# Patient Record
Sex: FEMALE | Race: WHITE | NOT HISPANIC OR LATINO
[De-identification: names, ages, dates, MRNs, and addresses within clinical notes are randomized per-mention and may not be internally consistent; named-entity substitution may affect disease eponyms.]

---

## 2021-01-13 PROBLEM — Z00.00 ENCOUNTER FOR PREVENTIVE HEALTH EXAMINATION: Status: ACTIVE | Noted: 2021-01-13

## 2021-01-19 ENCOUNTER — APPOINTMENT (OUTPATIENT)
Dept: ORTHOPEDIC SURGERY | Facility: CLINIC | Age: 71
End: 2021-01-19
Payer: MEDICARE

## 2021-01-19 VITALS — HEIGHT: 66 IN | WEIGHT: 174 LBS | RESPIRATION RATE: 16 BRPM | BODY MASS INDEX: 27.97 KG/M2

## 2021-01-19 DIAGNOSIS — Z86.39 PERSONAL HISTORY OF OTHER ENDOCRINE, NUTRITIONAL AND METABOLIC DISEASE: ICD-10-CM

## 2021-01-19 DIAGNOSIS — Z87.39 PERSONAL HISTORY OF OTHER DISEASES OF THE MUSCULOSKELETAL SYSTEM AND CONNECTIVE TISSUE: ICD-10-CM

## 2021-01-19 DIAGNOSIS — Z87.891 PERSONAL HISTORY OF NICOTINE DEPENDENCE: ICD-10-CM

## 2021-01-19 DIAGNOSIS — Z82.61 FAMILY HISTORY OF ARTHRITIS: ICD-10-CM

## 2021-01-19 PROCEDURE — 73110 X-RAY EXAM OF WRIST: CPT | Mod: 50

## 2021-01-19 PROCEDURE — 99203 OFFICE O/P NEW LOW 30 MIN: CPT

## 2021-01-19 PROCEDURE — 73070 X-RAY EXAM OF ELBOW: CPT | Mod: RT

## 2021-01-19 RX ORDER — LEVOTHYROXINE SODIUM 137 UG/1
TABLET ORAL
Refills: 0 | Status: ACTIVE | COMMUNITY

## 2021-02-19 ENCOUNTER — APPOINTMENT (OUTPATIENT)
Dept: ORTHOPEDIC SURGERY | Facility: CLINIC | Age: 71
End: 2021-02-19

## 2021-02-19 ENCOUNTER — APPOINTMENT (OUTPATIENT)
Dept: RHEUMATOLOGY | Facility: CLINIC | Age: 71
End: 2021-02-19
Payer: MEDICARE

## 2021-02-19 VITALS
BODY MASS INDEX: 27.8 KG/M2 | HEIGHT: 66 IN | DIASTOLIC BLOOD PRESSURE: 65 MMHG | OXYGEN SATURATION: 99 % | SYSTOLIC BLOOD PRESSURE: 107 MMHG | HEART RATE: 88 BPM | WEIGHT: 173 LBS | TEMPERATURE: 97.8 F

## 2021-02-19 PROCEDURE — 99203 OFFICE O/P NEW LOW 30 MIN: CPT

## 2021-02-22 ENCOUNTER — LABORATORY RESULT (OUTPATIENT)
Age: 71
End: 2021-02-22

## 2021-02-24 ENCOUNTER — TRANSCRIPTION ENCOUNTER (OUTPATIENT)
Age: 71
End: 2021-02-24

## 2021-02-25 ENCOUNTER — OUTPATIENT (OUTPATIENT)
Dept: OUTPATIENT SERVICES | Facility: HOSPITAL | Age: 71
LOS: 1 days | Discharge: HOME | End: 2021-02-25

## 2021-02-25 ENCOUNTER — APPOINTMENT (OUTPATIENT)
Dept: ORTHOPEDIC SURGERY | Facility: AMBULATORY SURGERY CENTER | Age: 71
End: 2021-02-25
Payer: MEDICARE

## 2021-02-25 LAB
CCP AB SER IA-ACNC: <8 UNITS
CRP SERPL-MCNC: 0.28 MG/DL
ERYTHROCYTE [SEDIMENTATION RATE] IN BLOOD BY WESTERGREN METHOD: 57 MM/HR
GLUCOSE BLDC GLUCOMTR-MCNC: 106 MG/DL — HIGH (ref 70–99)
RF+CCP IGG SER-IMP: NEGATIVE

## 2021-02-25 PROCEDURE — 26055 INCISE FINGER TENDON SHEATH: CPT | Mod: FA

## 2021-02-25 PROCEDURE — 64721 CARPAL TUNNEL SURGERY: CPT | Mod: LT

## 2021-02-25 NOTE — PHYSICAL EXAM
[General Appearance - Alert] : alert [General Appearance - In No Acute Distress] : in no acute distress [General Appearance - Well-Appearing] : healthy appearing [] : no respiratory distress [Respiration, Rhythm And Depth] : normal respiratory rhythm and effort [Exaggerated Use Of Accessory Muscles For Inspiration] : no accessory muscle use

## 2021-03-05 ENCOUNTER — APPOINTMENT (OUTPATIENT)
Dept: ORTHOPEDIC SURGERY | Facility: CLINIC | Age: 71
End: 2021-03-05
Payer: MEDICARE

## 2021-03-05 PROCEDURE — 99024 POSTOP FOLLOW-UP VISIT: CPT

## 2021-03-05 RX ORDER — OXYCODONE AND ACETAMINOPHEN 5; 325 MG/1; MG/1
5-325 TABLET ORAL
Qty: 15 | Refills: 0 | Status: DISCONTINUED | COMMUNITY
Start: 2021-02-24 | End: 2021-03-05

## 2021-03-05 NOTE — ASSESSMENT
[FreeTextEntry1] : 70 year old woman with bilateral hand pain with elevated Rheumatoid factor (29) referred for rheumatology evaluation. Given hand pain and elevated rheumatoid factor, ill evaluate for inflammatory arthropathy, will check ESR, CRP, and anti CCP.  Will obtain ultrasound of the hand bilaterally to evaluate for an underlying inflammatory arthropathy as due to time course of symptoms unlikely to see changes by xray suggestive of inflammatory changes.  Further management pending results.  Patient will be having surgery next week and will follow up after procedure.

## 2021-03-05 NOTE — REVIEW OF SYSTEMS
[Arthralgias] : arthralgias [Negative] : Heme/Lymph [FreeTextEntry9] : hands bilaterally [de-identified] : left hand secondary to carpal tunnel

## 2021-03-08 ENCOUNTER — NON-APPOINTMENT (OUTPATIENT)
Age: 71
End: 2021-03-08

## 2021-07-29 ENCOUNTER — NON-APPOINTMENT (OUTPATIENT)
Age: 71
End: 2021-07-29

## 2021-07-30 ENCOUNTER — APPOINTMENT (OUTPATIENT)
Dept: ORTHOPEDIC SURGERY | Facility: CLINIC | Age: 71
End: 2021-07-30
Payer: MEDICARE

## 2021-07-30 VITALS — BODY MASS INDEX: 27.8 KG/M2 | HEIGHT: 66 IN | WEIGHT: 173 LBS | RESPIRATION RATE: 16 BRPM

## 2021-07-30 DIAGNOSIS — M65.312 TRIGGER THUMB, LEFT THUMB: ICD-10-CM

## 2021-07-30 DIAGNOSIS — G56.03 CARPAL TUNNEL SYNDROM,BILATERAL UPPER LIMBS: ICD-10-CM

## 2021-07-30 DIAGNOSIS — M65.332 TRIGGER FINGER, LEFT MIDDLE FINGER: ICD-10-CM

## 2021-07-30 DIAGNOSIS — M65.352 TRIGGER FINGER, LEFT LITTLE FINGER: ICD-10-CM

## 2021-07-30 PROCEDURE — 99214 OFFICE O/P EST MOD 30 MIN: CPT | Mod: 25

## 2021-07-30 PROCEDURE — 20550 NJX 1 TENDON SHEATH/LIGAMENT: CPT | Mod: F3

## 2021-12-13 ENCOUNTER — APPOINTMENT (OUTPATIENT)
Dept: ORTHOPEDIC SURGERY | Facility: CLINIC | Age: 71
End: 2021-12-13
Payer: MEDICARE

## 2021-12-13 VITALS — RESPIRATION RATE: 16 BRPM | WEIGHT: 173 LBS | BODY MASS INDEX: 27.8 KG/M2 | HEIGHT: 66 IN

## 2021-12-13 DIAGNOSIS — M65.311 TRIGGER THUMB, RIGHT THUMB: ICD-10-CM

## 2021-12-13 DIAGNOSIS — L90.5 SCAR CONDITIONS AND FIBROSIS OF SKIN: ICD-10-CM

## 2021-12-13 PROCEDURE — 20550 NJX 1 TENDON SHEATH/LIGAMENT: CPT | Mod: F3,58

## 2021-12-13 PROCEDURE — 11900 INJECT SKIN LESIONS </W 7: CPT | Mod: RT

## 2021-12-13 PROCEDURE — 20600 DRAIN/INJ JOINT/BURSA W/O US: CPT | Mod: F7

## 2021-12-13 PROCEDURE — 99214 OFFICE O/P EST MOD 30 MIN: CPT | Mod: 25

## 2021-12-13 RX ORDER — LEVOTHYROXINE SODIUM 0.12 MG/1
125 TABLET ORAL
Qty: 30 | Refills: 0 | Status: ACTIVE | COMMUNITY
Start: 2021-10-19

## 2021-12-13 RX ORDER — ESTRADIOL AND NORETHINDRONE ACETATE 1; .5 MG/1; MG/1
1-0.5 TABLET, FILM COATED ORAL
Qty: 84 | Refills: 0 | Status: ACTIVE | COMMUNITY
Start: 2021-07-27

## 2021-12-13 RX ORDER — LIOTHYRONINE SODIUM 5 UG/1
5 TABLET ORAL
Qty: 90 | Refills: 0 | Status: ACTIVE | COMMUNITY
Start: 2021-09-08

## 2021-12-13 RX ORDER — LEVOTHYROXINE SODIUM 0.15 MG/1
150 TABLET ORAL
Qty: 90 | Refills: 0 | Status: ACTIVE | COMMUNITY
Start: 2021-03-08

## 2022-05-09 ENCOUNTER — APPOINTMENT (OUTPATIENT)
Dept: RHEUMATOLOGY | Facility: CLINIC | Age: 72
End: 2022-05-09
Payer: MEDICARE

## 2022-05-09 ENCOUNTER — APPOINTMENT (OUTPATIENT)
Dept: RADIOLOGY | Facility: CLINIC | Age: 72
End: 2022-05-09

## 2022-05-09 ENCOUNTER — RESULT REVIEW (OUTPATIENT)
Age: 72
End: 2022-05-09

## 2022-05-09 ENCOUNTER — OUTPATIENT (OUTPATIENT)
Dept: OUTPATIENT SERVICES | Facility: HOSPITAL | Age: 72
LOS: 1 days | End: 2022-05-09
Payer: MEDICARE

## 2022-05-09 ENCOUNTER — LABORATORY RESULT (OUTPATIENT)
Age: 72
End: 2022-05-09

## 2022-05-09 ENCOUNTER — TRANSCRIPTION ENCOUNTER (OUTPATIENT)
Age: 72
End: 2022-05-09

## 2022-05-09 VITALS
HEART RATE: 86 BPM | HEIGHT: 66.1 IN | SYSTOLIC BLOOD PRESSURE: 117 MMHG | OXYGEN SATURATION: 97 % | BODY MASS INDEX: 30.05 KG/M2 | WEIGHT: 187 LBS | TEMPERATURE: 98 F | DIASTOLIC BLOOD PRESSURE: 69 MMHG

## 2022-05-09 DIAGNOSIS — M19.031 PRIMARY OSTEOARTHRITIS, RIGHT WRIST: ICD-10-CM

## 2022-05-09 DIAGNOSIS — M19.032 PRIMARY OSTEOARTHRITIS, LEFT WRIST: ICD-10-CM

## 2022-05-09 PROCEDURE — 36415 COLL VENOUS BLD VENIPUNCTURE: CPT

## 2022-05-09 PROCEDURE — 99214 OFFICE O/P EST MOD 30 MIN: CPT | Mod: 25

## 2022-05-09 PROCEDURE — 73120 X-RAY EXAM OF HAND: CPT | Mod: 26,50

## 2022-05-09 NOTE — DATA REVIEWED
[FreeTextEntry1] : tsh 0.9311\par esr 46\par crp <1\par rf 28\par cbc\par 8.4\par hgb 12.0\par hct 38.2\par plt 335\par \par bun/creat 19/0.64\par lfts norm\par a1c 5.7\par

## 2022-05-09 NOTE — PHYSICAL EXAM
[General Appearance - Alert] : alert [General Appearance - In No Acute Distress] : in no acute distress [General Appearance - Well Nourished] : well nourished [General Appearance - Well Developed] : well developed [General Appearance - Well-Appearing] : healthy appearing [Sclera] : the sclera and conjunctiva were normal [Examination Of The Oral Cavity] : the lips and gums were normal [Oropharynx] : the oropharynx was normal [Respiration, Rhythm And Depth] : normal respiratory rhythm and effort [Exaggerated Use Of Accessory Muscles For Inspiration] : no accessory muscle use [Edema] : there was no peripheral edema [Abnormal Walk] : normal gait [Nail Clubbing] : no clubbing  or cyanosis of the fingernails [Musculoskeletal - Swelling] : no joint swelling seen [] : no rash [Oriented To Time, Place, And Person] : oriented to person, place, and time [Impaired Insight] : insight and judgment were intact [Affect] : the affect was normal [FreeTextEntry1] : enlargement of the right 3rd PIP with decreased flexion, good handgrip of the left hand. No active synovitis  of the upper and lower extremities.  Decreased ROM of the cervical spine.

## 2022-05-09 NOTE — REVIEW OF SYSTEMS
[Joint Pain] : joint pain [Joint Swelling] : joint swelling [Joint Stiffness] : joint stiffness [Negative] : Heme/Lymph [de-identified] : neuropathy

## 2022-05-09 NOTE — HISTORY OF PRESENT ILLNESS
[FreeTextEntry1] : February 19, 2021\par 70 year old woman referred for rheumatology evaluation.\par Was seeing pain specialist for back pain\par Issues with neck and spine\par \par Dr. Lazar neurologist\par Had carpal tunnel severe on left, less on right\par Diagnosed with peripheral neuropathy by EMG in the upper and lower extremities\par \par Had blood tests completed in October \par Thyroid ab elevated\par RF 29\par \par In the meantime, last March 2020 had COVID\par Was in [place], flew back in March 19, severe flu 5-7 days, lost smell for one day\par Started to feel better with episode of severe fatigue lasted for about 2 months\par \par Right 3rd finger pain, could not wear rings anymore\par 7.5-8.5 increase in ring size\par Thumbs started to crack and snap \par Hands are always in pain now, feels unable to use as previously\par Left hand operation scheduled for next week, carpal tunnel and trigger finger release\par \par Feels difficult to sleep \par Feels condition decreasing since covid\par Feels brain fog\par Had second covid vaccine felt joint pain all over, Moderna, last Saturday\par \par Had steroid injection for the carpal tunnel which helped \par Can't wear a brace for carpal tunnel because feels worsens symptoms\par If wears gloves, the warmth feels better\par During the day feels pain is better\par Feels an electric shock in the finger, now has to go away on its own, takes about couple of minutes or less to improve\par \par Base of the right thumb pain\par Left hand pain related to known carpal tunnel \par \par Taking tumeric \par Takes levothyroxine \par Estradiol but now \par Xrays completed with hand orthopedist, not available to me at this time\par \par May 9, 2022\par Pain in the hands\par Pain in the back, feet and neck\par Seeing chiropractor\par Started PT for the neck and back\par CTS bilaterally, had surgery in the left hand, did not help all of the symptoms\par Arthritis in right 3rd PIP and thumb\par Helped with paraffin and hot water bath\par Exercises\par Night is the worst\par Brace does not help at night, feels compressed\par As the night progresses the pain improves\par Joined the gym, walking in the water, jacuzzi, steam room, sauna\par Had covid in March 2020, initially though had the flu, feels ot quite the same since that time\par Trying to lose weight\par Last month, thyroid medication dose reduced, felt weak and now increased again, levothyroid 137\par metformin recently started, started 500 mg, bid dosing now\par vitamins and minerals\par tumeric\par alem\par vitamin E, C, selenium\par zinc\par magnesium at night\par resveratrol with bene-\par EPA\par Takes biotin in the morning\par Recent labs reviewed on phone, ESR 45, CRP normal \par RF 28

## 2022-05-09 NOTE — ASSESSMENT
[FreeTextEntry1] : 71 year old woman with bilateral hand pain with elevated Rheumatoid factor (29) returns for follow up   rheumatology evaluation. Exams findings and xrays findings most consistent with degenerative arthritis at this time, but given an increase in pain recently associated with some stiffness, will further evaluate for possible secondary inflammatory arthritis as well.  Will repeat ESR, CRP, and anti CCP. Will repeat xrays of the hands bilaterally to evaluate for an underlying inflammatory arthropathy as well.  Discussed possible addition of cymbalta given peripheral neuropathy as well.  Further management pending results.

## 2022-06-16 LAB
ALBUMIN MFR SERPL ELPH: 57.7 %
ALBUMIN SERPL-MCNC: 4.3 G/DL
ALBUMIN/GLOB SERPL: 1.4 RATIO
ALPHA1 GLOB MFR SERPL ELPH: 3.4 %
ALPHA1 GLOB SERPL ELPH-MCNC: 0.3 G/DL
ALPHA2 GLOB MFR SERPL ELPH: 12.7 %
ALPHA2 GLOB SERPL ELPH-MCNC: 0.9 G/DL
B-GLOBULIN MFR SERPL ELPH: 8.9 %
B-GLOBULIN SERPL ELPH-MCNC: 0.7 G/DL
CCP AB SER IA-ACNC: <8 UNITS
CRP SERPL-MCNC: <3 MG/L
ERYTHROCYTE [SEDIMENTATION RATE] IN BLOOD BY WESTERGREN METHOD: 47 MM/HR
GAMMA GLOB FLD ELPH-MCNC: 1.3 G/DL
GAMMA GLOB MFR SERPL ELPH: 17.3 %
INTERPRETATION SERPL IEP-IMP: NORMAL
PROT SERPL-MCNC: 7.4 G/DL
PROT SERPL-MCNC: 7.4 G/DL
RF+CCP IGG SER-IMP: NEGATIVE
VIT B12 SERPL-MCNC: 540 PG/ML

## 2022-06-21 ENCOUNTER — APPOINTMENT (OUTPATIENT)
Dept: RHEUMATOLOGY | Facility: CLINIC | Age: 72
End: 2022-06-21
Payer: MEDICARE

## 2022-06-21 ENCOUNTER — LABORATORY RESULT (OUTPATIENT)
Age: 72
End: 2022-06-21

## 2022-06-21 VITALS
BODY MASS INDEX: 29.09 KG/M2 | HEIGHT: 66.1 IN | WEIGHT: 181 LBS | TEMPERATURE: 98 F | DIASTOLIC BLOOD PRESSURE: 55 MMHG | SYSTOLIC BLOOD PRESSURE: 105 MMHG | OXYGEN SATURATION: 97 % | HEART RATE: 86 BPM

## 2022-06-21 DIAGNOSIS — M79.643 PAIN IN UNSPECIFIED HAND: ICD-10-CM

## 2022-06-21 PROCEDURE — 99213 OFFICE O/P EST LOW 20 MIN: CPT | Mod: 25

## 2022-06-21 PROCEDURE — 36415 COLL VENOUS BLD VENIPUNCTURE: CPT

## 2022-06-22 LAB
ALBUMIN SERPL ELPH-MCNC: 4.4 G/DL
ALP BLD-CCNC: 67 U/L
ALT SERPL-CCNC: 18 U/L
ANION GAP SERPL CALC-SCNC: 11 MMOL/L
AST SERPL-CCNC: 16 U/L
BASOPHILS # BLD AUTO: 0.05 K/UL
BASOPHILS NFR BLD AUTO: 0.6 %
BILIRUB SERPL-MCNC: 0.6 MG/DL
BUN SERPL-MCNC: 19 MG/DL
CALCIUM SERPL-MCNC: 9.6 MG/DL
CHLORIDE SERPL-SCNC: 105 MMOL/L
CO2 SERPL-SCNC: 23 MMOL/L
CREAT SERPL-MCNC: 0.56 MG/DL
CRP SERPL-MCNC: <3 MG/L
EGFR: 97 ML/MIN/1.73M2
EOSINOPHIL # BLD AUTO: 0.16 K/UL
EOSINOPHIL NFR BLD AUTO: 1.8 %
ERYTHROCYTE [SEDIMENTATION RATE] IN BLOOD BY WESTERGREN METHOD: 45 MM/HR
GLUCOSE SERPL-MCNC: 101 MG/DL
HCT VFR BLD CALC: 38.2 %
HGB BLD-MCNC: 11.5 G/DL
IMM GRANULOCYTES NFR BLD AUTO: 0.2 %
LYMPHOCYTES # BLD AUTO: 2.69 K/UL
LYMPHOCYTES NFR BLD AUTO: 30.9 %
MAN DIFF?: NORMAL
MCHC RBC-ENTMCNC: 19.5 PG
MCHC RBC-ENTMCNC: 30.1 GM/DL
MCV RBC AUTO: 64.9 FL
MONOCYTES # BLD AUTO: 0.65 K/UL
MONOCYTES NFR BLD AUTO: 7.5 %
NEUTROPHILS # BLD AUTO: 5.14 K/UL
NEUTROPHILS NFR BLD AUTO: 59 %
PLATELET # BLD AUTO: 277 K/UL
POTASSIUM SERPL-SCNC: 4.8 MMOL/L
PROT SERPL-MCNC: 7.1 G/DL
RBC # BLD: 5.89 M/UL
RBC # FLD: 16.7 %
SODIUM SERPL-SCNC: 139 MMOL/L
WBC # FLD AUTO: 8.71 K/UL

## 2022-06-22 NOTE — HISTORY OF PRESENT ILLNESS
[FreeTextEntry1] : February 19, 2021\par 70 year old woman referred for rheumatology evaluation.\par Was seeing pain specialist for back pain\par Issues with neck and spine\par \par Dr. Lazar neurologist\par Had carpal tunnel severe on left, less on right\par Diagnosed with peripheral neuropathy by EMG in the upper and lower extremities\par \par Had blood tests completed in October \par Thyroid ab elevated\par RF 29\par \par In the meantime, last March 2020 had COVID\par Was in [place], flew back in March 19, severe flu 5-7 days, lost smell for one day\par Started to feel better with episode of severe fatigue lasted for about 2 months\par \par Right 3rd finger pain, could not wear rings anymore\par 7.5-8.5 increase in ring size\par Thumbs started to crack and snap \par Hands are always in pain now, feels unable to use as previously\par Left hand operation scheduled for next week, carpal tunnel and trigger finger release\par \par Feels difficult to sleep \par Feels condition decreasing since covid\par Feels brain fog\par Had second covid vaccine felt joint pain all over, Moderna, last Saturday\par \par Had steroid injection for the carpal tunnel which helped \par Can't wear a brace for carpal tunnel because feels worsens symptoms\par If wears gloves, the warmth feels better\par During the day feels pain is better\par Feels an electric shock in the finger, now has to go away on its own, takes about couple of minutes or less to improve\par \par Base of the right thumb pain\par Left hand pain related to known carpal tunnel \par \par Taking tumeric \par Takes levothyroxine \par Estradiol but now \par Xrays completed with hand orthopedist, not available to me at this time\par \par May 9, 2022\par Pain in the hands\par Pain in the back, feet and neck\par Seeing chiropractor\par Started PT for the neck and back\par CTS bilaterally, had surgery in the left hand, did not help all of the symptoms\par Arthritis in right 3rd PIP and thumb\par Helped with paraffin and hot water bath\par Exercises\par Night is the worst\par Brace does not help at night, feels compressed\par As the night progresses the pain improves\par Joined the gym, walking in the water, jacuzzi, steam room, sauna\par Had covid in March 2020, initially though had the flu, feels not quite the same since that time\par Trying to lose weight\par Last month, thyroid medication dose reduced, felt weak and now increased again, levothyroid 137\par metformin recently started, started 500 mg, bid dosing now\par vitamins and minerals\par tumeric\par alem\par vitamin E, C, selenium\par zinc\par magnesium at night\par resveratrol with bene-\par EPA\par Takes biotin in the morning\par Recent labs reviewed on phone, ESR 45, CRP normal \par RF 28\par \par June 21, 2022\par Started on cymbalta 30 mg, feels benefit in the hands\par Left hand with decreased handgrip but pain better\par Taking tumeric, zinc, vitamin c, magnesium, selenium\par Vitamin D drops as well\par Also had the flu two weeks ago\par Had covid pcr neg\par Took antibiotics for sore throat, completed 10 day course of amoxicillin\par Reviewed lab results with patient\par Reviewed xray with patient

## 2022-06-22 NOTE — ASSESSMENT
[FreeTextEntry1] : 71 year old woman with bilateral hand pain with elevated Rheumatoid factor of 29 with negative CCP antibody,  returns for rheumatology follow up.  Exam findings and xrays findings most consistent with degenerative arthritis at this time, labs reviewed with ESR 47 and CRP and anti CCP ab  normal. Serum protein electrophoresis abnormal pattern identified which is not fully diagnostic raises the possibility of a weak restricted band, will repeat today.  atient started on Cymbalta 30 mg daily for peripheral neuropathy as well, with benefit in symptoms to date.  Further management pending repeat blood tests to be completed today in office. No

## 2022-06-22 NOTE — DATA REVIEWED
[FreeTextEntry1] : tsh 0.9311\par esr 46\par crp <1\par rf 28\par cbc\par 8.4\par hgb 12.0\par hct 38.2\par plt 335\par \par bun/creat 19/0.64\par lfts norm\par a1c 5.7\par \par EMG July 14, 2020\par Abnormal study.  Chronic bilateral C5-C6 and C6-C7 radiculopathies with bilateral moderately severe median neuropathies at the wrists as can be seen in carpal tunnel syndrome all without active denervation.\par \par \par  Xray Hand 2 Views, Bilateral             Final\par \par No Documents Attached\par \par \par \par \par   EXAM: [id number] - XR HAND 2 VIEWS BI  - ORDERED BY: MORA ZHU\par \par \par PROCEDURE DATE:  05/09/2022\par \par \par \par INTERPRETATION:  CLINICAL HISTORY: 71-year-old with hand pain.\par \par \par \par \par IMPRESSION: Frontal, lateral and oblique views of the left hand demonstrates degenerative change of the first CMC joint. Degenerative change of the distal radioulnar joint. Radiocarpal, intercarpal, MCP, PIP and DIP joint spaces are preserved. No fracture. No dislocation. Appropriate carpal alignment. Appropriate osseous mineralization. No evidence of inflammatory arthropathy.\par \par \par Frontal, lateral and oblique views of the right hand demonstrates degenerative change of the distal radioulnar joint and first CMC joint. The radiocarpal intercarpal, carpometacarpal, MCP, PIP and DIP joint spaces are preserved. Appropriate carpal alignment. Appropriate osseous mineralization. No evidence of inflammatory arthropathy.\par \par \par \par \par \par \par Dr. Gianluca Che can be reached at [email or web address] with questions regarding this report.\par \par --- End of Report ---\par \par \par \par \par \par \par GIANLUCA CHE MD; Attending Radiologist\par This document has been electronically signed. May  9 2022  2:41PM\par \par  \par \par  Ordered by: MORA ZHU       Collected/Examined: [id number] 11:21AM       \par Verified by: MORA ZHU 09May2022 02:55PM       \par  Result Communication: No patient communication needed at this time;\par Stage: Final       \par  Performed at: [facility]       Resulted: 09May2022 02:40PM       Last Updated: 09May2022 02:55PM       Accession: [id number]

## 2022-06-29 ENCOUNTER — NON-APPOINTMENT (OUTPATIENT)
Age: 72
End: 2022-06-29

## 2022-06-29 LAB
ALBUMIN MFR SERPL ELPH: 59.2 %
ALBUMIN SERPL-MCNC: 4.2 G/DL
ALBUMIN/GLOB SERPL: 1.4 RATIO
ALPHA1 GLOB MFR SERPL ELPH: 3.5 %
ALPHA1 GLOB SERPL ELPH-MCNC: 0.2 G/DL
ALPHA2 GLOB MFR SERPL ELPH: 11.9 %
ALPHA2 GLOB SERPL ELPH-MCNC: 0.8 G/DL
B-GLOBULIN MFR SERPL ELPH: 8.8 %
B-GLOBULIN SERPL ELPH-MCNC: 0.6 G/DL
GAMMA GLOB FLD ELPH-MCNC: 1.2 G/DL
GAMMA GLOB MFR SERPL ELPH: 16.6 %
INTERPRETATION SERPL IEP-IMP: NORMAL
PROT SERPL-MCNC: 7.1 G/DL
PROT SERPL-MCNC: 7.1 G/DL

## 2022-07-05 ENCOUNTER — NON-APPOINTMENT (OUTPATIENT)
Age: 72
End: 2022-07-05

## 2023-04-14 ENCOUNTER — APPOINTMENT (OUTPATIENT)
Dept: ORTHOPEDIC SURGERY | Facility: CLINIC | Age: 73
End: 2023-04-14
Payer: MEDICARE

## 2023-04-14 VITALS — WEIGHT: 181 LBS | HEIGHT: 66 IN | BODY MASS INDEX: 29.09 KG/M2 | RESPIRATION RATE: 16 BRPM

## 2023-04-14 DIAGNOSIS — M65.341 TRIGGER FINGER, RIGHT RING FINGER: ICD-10-CM

## 2023-04-14 DIAGNOSIS — M65.322 TRIGGER FINGER, LEFT INDEX FINGER: ICD-10-CM

## 2023-04-14 DIAGNOSIS — M65.321 TRIGGER FINGER, RIGHT INDEX FINGER: ICD-10-CM

## 2023-04-14 DIAGNOSIS — M65.342 TRIGGER FINGER, LEFT RING FINGER: ICD-10-CM

## 2023-04-14 DIAGNOSIS — G56.01 CARPAL TUNNEL SYNDROME, RIGHT UPPER LIMB: ICD-10-CM

## 2023-04-14 DIAGNOSIS — M65.331 TRIGGER FINGER, RIGHT MIDDLE FINGER: ICD-10-CM

## 2023-04-14 PROCEDURE — 20550 NJX 1 TENDON SHEATH/LIGAMENT: CPT | Mod: F8

## 2023-04-14 PROCEDURE — 99213 OFFICE O/P EST LOW 20 MIN: CPT | Mod: 25

## 2023-09-20 ENCOUNTER — APPOINTMENT (OUTPATIENT)
Dept: RHEUMATOLOGY | Facility: CLINIC | Age: 73
End: 2023-09-20

## 2024-08-27 VITALS — RESPIRATION RATE: 16 BRPM | WEIGHT: 181 LBS | HEIGHT: 66 IN | BODY MASS INDEX: 29.09 KG/M2

## 2024-08-27 NOTE — REVIEW OF SYSTEMS
[Ambidextrous] : ambidextrous [Joint Pain] : joint pain [Joint Stiffness] : joint stiffness [Negative] : Allergic/Immunologic

## 2024-08-29 ENCOUNTER — APPOINTMENT (OUTPATIENT)
Dept: ORTHOPEDIC SURGERY | Facility: CLINIC | Age: 74
End: 2024-08-29
Payer: MEDICARE

## 2024-08-29 DIAGNOSIS — M65.351 TRIGGER FINGER, RIGHT LITTLE FINGER: ICD-10-CM

## 2024-08-29 DIAGNOSIS — M65.311 TRIGGER THUMB, RIGHT THUMB: ICD-10-CM

## 2024-08-29 DIAGNOSIS — M65.331 TRIGGER FINGER, RIGHT MIDDLE FINGER: ICD-10-CM

## 2024-08-29 DIAGNOSIS — M65.321 TRIGGER FINGER, RIGHT INDEX FINGER: ICD-10-CM

## 2024-08-29 DIAGNOSIS — M65.341 TRIGGER FINGER, RIGHT RING FINGER: ICD-10-CM

## 2024-08-29 DIAGNOSIS — R20.2 ANESTHESIA OF SKIN: ICD-10-CM

## 2024-08-29 DIAGNOSIS — R20.0 ANESTHESIA OF SKIN: ICD-10-CM

## 2024-08-29 PROCEDURE — 73130 X-RAY EXAM OF HAND: CPT | Mod: 50

## 2024-08-29 PROCEDURE — 99213 OFFICE O/P EST LOW 20 MIN: CPT

## 2024-08-29 NOTE — PHYSICAL EXAM
[de-identified] : There is no skin changes or atrophy from previous injections.  The wrists have a symmetric range of motion bilaterally. There is no tenderness over the scaphoid, scapholunate or lunotriquetral ligaments bilaterally. There is a negative Maldonado test bilaterally. There is negative tenderness over the radial ulnar joint or TFCC and no evidence of instability bilaterally. No tenderness over the pisotriquetral or hamate hook or CMC joint bilaterally. There is 5 over 5 strength of the wrists bilaterally.  There is positive tenderness over the A1 pulleys of the right second third and fourth digits.  No tenderness over the A1 pulleys of the thumb and fifth.  No tenderness or instability of the MP PIP DIP joint or flexor or extensor mechanisms.  Upon making a fist the 2nd through 5th digits are 3 cm from the palm passively correctable to 1 cm.  There is thenar atrophy on the right but opposition is present there is a positive Tinel's and Phalen's of the right wrist.  No sensitive over the median nerve the level of the elbow or axilla negative Spurling test.

## 2024-08-29 NOTE — ASSESSMENT
[FreeTextEntry1] : Patient has a right second third and fourth trigger fingers as well as carpal tunnel which is recurred despite rest activity modification splinting and multiple injections in the past.  The risks, benefits, alternatives and associated differential diagnosis was discussed with the patient. Options ranged from conservative care, therapy to surgical intervention were reviewed and all questions answered. Risks included incomplete resolution of symptoms, worsening of symptoms recurrence, tissue loss, functional loss and other risks associated with treatment of this condition Patient appeared to have an excellent understanding of the risks as well as differential diagnosis associated with this condition.  Patient would like to proceed with open right carpal tunnel release and right second third and fourth trigger finger releases.

## 2024-08-29 NOTE — PHYSICAL EXAM
[de-identified] : There is no skin changes or atrophy from previous injections.  The wrists have a symmetric range of motion bilaterally. There is no tenderness over the scaphoid, scapholunate or lunotriquetral ligaments bilaterally. There is a negative Maldonado test bilaterally. There is negative tenderness over the radial ulnar joint or TFCC and no evidence of instability bilaterally. No tenderness over the pisotriquetral or hamate hook or CMC joint bilaterally. There is 5 over 5 strength of the wrists bilaterally.  There is positive tenderness over the A1 pulleys of the right second third and fourth digits.  No tenderness over the A1 pulleys of the thumb and fifth.  No tenderness or instability of the MP PIP DIP joint or flexor or extensor mechanisms.  Upon making a fist the 2nd through 5th digits are 3 cm from the palm passively correctable to 1 cm.  There is thenar atrophy on the right but opposition is present there is a positive Tinel's and Phalen's of the right wrist.  No sensitive over the median nerve the level of the elbow or axilla negative Spurling test.

## 2024-08-29 NOTE — HISTORY OF PRESENT ILLNESS
[Ambidextrous] : ambidextrous [FreeTextEntry1] : Patient presents for recurrent trigger right second, third, fourth, and fifth digit despite multiple injections.  Patient also complains of right hand numbness for the past 6 months.  No trauma or injuries.  Patient is here for discussion for surgery.  right thumb CMC injection 12/13/21 right second trigger finger #1 injection 4/14/23 right third trigger finger #3 injection 12/13/21 right fourth trigger finger injection #1 4/14/23 left second trigger finger #1 injection 4/14/23 left third post op injection #1 12/13/21 left fourth trigger finger #3 injection 4/14/23 left fifth trigger finger #1 injection 7/30/21 s/p left carpal tunnel release, left thumb and third TFR. DOS: 2/25/21 She is diagnosed with RA. She is taking Cymbalta

## 2024-09-25 ENCOUNTER — APPOINTMENT (OUTPATIENT)
Dept: ORTHOPEDIC SURGERY | Facility: AMBULATORY SURGERY CENTER | Age: 74
End: 2024-09-25

## 2024-10-07 ENCOUNTER — APPOINTMENT (OUTPATIENT)
Dept: ORTHOPEDIC SURGERY | Facility: CLINIC | Age: 74
End: 2024-10-07